# Patient Record
Sex: FEMALE | Race: WHITE
[De-identification: names, ages, dates, MRNs, and addresses within clinical notes are randomized per-mention and may not be internally consistent; named-entity substitution may affect disease eponyms.]

---

## 2018-04-24 ENCOUNTER — HOSPITAL ENCOUNTER (EMERGENCY)
Dept: HOSPITAL 25 - ED | Age: 53
Discharge: HOME | End: 2018-04-24
Payer: COMMERCIAL

## 2018-04-24 VITALS — SYSTOLIC BLOOD PRESSURE: 124 MMHG | DIASTOLIC BLOOD PRESSURE: 82 MMHG

## 2018-04-24 DIAGNOSIS — M54.5: Primary | ICD-10-CM

## 2018-04-24 DIAGNOSIS — S09.90XA: ICD-10-CM
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
## 2018-04-24 DIAGNOSIS — S30.0XXA: ICD-10-CM
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
## 2018-04-24 PROCEDURE — 72128 CT CHEST SPINE W/O DYE: CPT

## 2018-04-24 PROCEDURE — 72125 CT NECK SPINE W/O DYE: CPT

## 2018-04-24 PROCEDURE — 99282 EMERGENCY DEPT VISIT SF MDM: CPT

## 2018-04-24 PROCEDURE — 72131 CT LUMBAR SPINE W/O DYE: CPT

## 2018-04-24 PROCEDURE — 70450 CT HEAD/BRAIN W/O DYE: CPT

## 2018-04-24 NOTE — RAD
HISTORY: Fall, back pain



COMPARISONS: None



TECHNIQUE: Multiple contiguous axial CT scans were obtained of the thoracic spine without 

intravenous contrast, with coronal and sagittal multiplanar reformations.        



FINDINGS:



SPINAL CANAL: Evaluation of the central canal is limited on CT technique; however, there

is no obvious canalicular mass or epidural hemorrhage.

ALIGNMENT: There is mild extra scoliotic curvature of the spine.

VERTEBRAL BODIES: The vertebral bodies are preserved in height. The bones are normal in

attenuation. There is multilevel anterolateral marginal osteophyte formation.

JOINTS: There is mild osteoarthritis of the costovertebral articulations.

MUSCULATURE: Unremarkable

INTERVERTEBRAL DISCS: There is diffuse loss of intervertebral disc height throughout the

spine.



AXIAL IMAGES: There is no osseous central canal stenosis or neuroforaminal narrowing.



SOFT TISSUES: The visualized soft tissues of the chest and abdomen are unremarkable.



OTHER: None



IMPRESSION:

NO ACUTE OSSEOUS INJURY TO THE THORACIC SPINE

## 2018-04-24 NOTE — ED
Back Pain





- HPI Summary


HPI Summary: 





Patient is a 53-year-old female presenting to the ED with the chief complaint 

of low back pain and dizziness.  She states she sustained an injury 2 days ago 

after falling from a forklift and landing directly onto her lumbar spine and 

hitting her head.  She was seen at [facility] who stapled her head, but did not 

obtain a CT scan.  At the time, she denied any pain to her back.  She states on 

the way home from the hospital she began to feel pain in her back.  She arrives 

on this date to further evaluate the pain in her back and endorses a large 

amount of ecchymosis to the area.  She has not taken anything for relief.  She 

also endorses dizziness 2 days, only at bedtime when she is trying to sleep.  

This is new since the head injury.  She denies any confusion, memory loss, 

photophobia, nausea or other migrainous symptoms.  Symptoms are aggravated with 

lying flat and attempting to sleep, alleviated with nothing.





- History of Current Complaint


Chief Complaint: EDBackInjuryPain


Stated Complaint: FALL/BRUISING ON BACK


Time Seen by Provider: 04/24/18 13:02


Hx Obtained From: Patient


Onset/Duration: Sudden Onset


Onset/Duration: Started Days Ago


Timing: Constant


Back Pain Location: Is Discrete @ - Low back, right-sided parietal scalp


Pain Intensity: 2


Pain Scale Used: 0-10 Numeric


Character: Aching, Throbbing


Aggravating Symptom(s): Movement, Lifting, Bending


Alleviating Symptom(s): Rest, Position, Heat


Associated Signs And Symptoms: Positive: Swelling, Bruising.  Negative: Redness

, Fever, Weakness, Numbness, Tingling, Abdominal Pain, Flank Pain, Bladder 

Incontinence, Bowel Incontinence, Weight Loss, Pain with Weight Bearing





- Risk Factors


AAA Risk Factors: Negative


TAD Risk Factors: Negative


Cauda Equina Risk Factors: Negative


Epidural Abscess Risk Factors: Negative





- Allergies/Home Medications


Allergies/Adverse Reactions: 


 Allergies











Allergy/AdvReac Type Severity Reaction Status Date / Time


 


No Known Allergies Allergy   Verified 04/24/18 12:18











Home Medications: 


 Home Medications





Dextroamphetamine/Amphetamine [Amphetamine/Dextroampheta 30 mg-] 30 mg PO DAILY 

04/24/18 [History Confirmed 04/24/18]


Lisinopril [Lisinopril] 20 mg PO DAILY 04/24/18 [History Confirmed 04/24/18]











PMH/Surg Hx/FS Hx/Imm Hx


Previously Healthy: Yes





- Immunization History


Hx Pertussis Vaccination: No


Immunizations Up to Date: Unable to Obtain/Confirm


Infectious Disease History: No


Infectious Disease History: 


   Denies: Traveled Outside the [place] in Last 30 Days





- Social History


Occupation: Employed Full-time


Lives: With Family


Alcohol Use: Rare


Hx Substance Use: No


Substance Use Type: Reports: None


Hx Tobacco Use: No


Smoking Status (MU): Never Smoked Tobacco





Review of Systems


Constitutional: Negative


Negative: Fever, Chills, Fatigue, Skin Diaphoresis


Negative: Photophobia, Blurred Vision


Negative: Palpitations, Chest Pain


Negative: Shortness Of Breath, Cough


Negative: Abdominal Pain, Vomiting, Diarrhea, Nausea


Genitourinary: Negative


Positive: no symptoms reported, see HPI


Positive: Myalgia - bilateral lower back pain


Positive: Bruising - ecchymosis to the lower back


Neurological: Other - dizziness


All Other Systems Reviewed And Are Negative: Yes





Physical Exam


Triage Information Reviewed: Yes


Vital Signs On Initial Exam: 


 Initial Vitals











Temp Pulse Resp BP Pulse Ox


 


 98.2 F   61   16   119/88   98 


 


 04/24/18 12:14  04/24/18 12:14  04/24/18 12:14  04/24/18 12:14  04/24/18 12:14











Vital Signs Reviewed: Yes


Appearance: Positive: Well-Appearing, Well-Nourished


Skin: Positive: Warm, Skin Color Reflects Adequate Perfusion, Other - 

Ecchymosis to the bilateral lower back; Staples already placed to the right 

parietal area of the scalp


Head/Face: Positive: Normal Head/Face Inspection - Staples are placed to the 

right parietal area


Eyes: Positive: EOMI, LEATHA, Conjunctiva Clear


Neck: Positive: Supple, No Lymphadenopathy


Respiratory/Lung Sounds: Positive: Clear to Auscultation, Breath Sounds Present


Cardiovascular: Positive: RRR, Pulses are Symmetrical in both Upper and Lower 

Extremities


Musculoskeletal: Positive: Normal, Strength/ROM Intact, Other - Full range of 

motion, rotation at the hips without pain.  Negative: Limited @, Interruption @

, Erlin Sign Left, Erlin Sign Right, Edema Left, Edema Right


Neurological: Positive: Normal, Sensory/Motor Intact, Alert, Oriented to Person 

Place, Time, CN Intact II-III, Normal Gait, Facial Symmetry.  Negative: Facial 

Droop, Slurred Speech, Heel to Toe, Finger to Nose, Ataxic Gait


Psychiatric: Positive: Normal, Affect/Mood Appropriate


AVPU Assessment: Alert





Diagnostics





- Vital Signs


 Vital Signs











  Temp Pulse Resp BP Pulse Ox


 


 04/24/18 15:45  98 F  67  16  124/82  100


 


 04/24/18 12:14  98.2 F  61  16  119/88  98














- Laboratory


Lab Statement: Any lab studies that have been ordered have been reviewed, and 

results considered in the medical decision making process.





Back Pain Course/Dx





- Course


Course Of Treatment: During the course of treatment the patient is evaluated 

for multiple trauma.  She is evaluated for right-sided head trauma.  Staples 

are currently into the right parietal scalp.  The lower lumbar spine is with 

hematoma and ecchymosis diffusely throughout with inflammation surrounding.  

Tenderness to the bilateral lower back without tenderness to the spine.  Full 

range of motion and strength to the lower extremities.  Pulses +2 intact 

bilaterally.  No edema in the bilateral extremities.  She denies any confusion, 

memory last, visual disturbances or changes, however endorses some dizziness 

with lying flat.  CT brain, cervical, thoracic, and lumbar spine obtained.  

Spine CTs show no acute findings, however brain CT shows:  IMPRESSION:  1. NO 

EVIDENCE FOR ACUTE INTRACRANIAL ABNORMALITY.  2. 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  I discussed these findings with 

the patient and she agrees to follow up with her PCP regarding the CT brain 

findings.  Vital signs are stable upon discharge.  She is given meclizine and 

tramadol for pain relief and dizziness and will follow-up with her doctor this 

week.





- Diagnoses


Differential Diagnosis/HQI/PQRI: Positive: Fracture, Strain, Sprain, Other - 

Hematoma, contusion


Provider Diagnoses: 


 Hematoma, Head injury








Images





- Images


Full Body (No Head): 


  __________________________














  __________________________





 1 - Ecchymosis








Discharge





- Sign-Out/Discharge


Documenting (check all that apply): Discharge/Admit/Transfer





- Discharge Plan


Condition: Stable


Disposition: HOME


Prescriptions: 


Meclizine TAB* [Antivert 12.5 TAB*] 12.5 mg PO TID PRN #12 tab MDD 3


 PRN Reason: Dizziness


traMADol TAB* [Ultram*] 50 mg PO Q8H PRN #12 tab MDD 3


 PRN Reason: Pain


Patient Education Materials:  Hematoma (ED)


Forms:  *Work Release


Referrals: 


Edmar Dietrich MD [Primary Care Provider] - 


Additional Instructions: 


Please follow up with your PCP regarding your findings on CT exam


Ibuprofen 600mng three times daily for inflammation


Tramadol 50mg up to three times daily for pain - take on opposite schedule of 

ibuprofen


Moist heat to the area





- Billing Disposition and Condition


Condition: STABLE


Disposition: HOME

## 2018-04-24 NOTE — RAD
INDICATION:  Fall, head injury.



COMPARISON:  There are no prior studies available for comparison.



TECHNIQUE: Contiguous axial sections of the brain were obtained from the skull base to the

vertex without contrast.



FINDINGS: The ventricles, cisterns and sulci are within normal limits.  There is a small

area of decreased density in the right caudate nucleus head. No other focal lateral

mallear mass effect are seen. There is no evidence for hemorrhage.



No significant focal osseous abnormality is seen. The visualized portion of the paranasal

sinuses and mastoid air cells appear clear.



IMPRESSION:  

1. NO EVIDENCE FOR ACUTE INTRACRANIAL ABNORMALITY.

2. 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

## 2018-04-24 NOTE — RAD
HISTORY: Fall. No other history is provided



COMPARISONS: None



TECHNIQUE: Multiple contiguous axial CT scans were obtained of the cervical spine without

intravenous contrast, with coronal and sagittal multiplanar reformations.    



FINDINGS:



BRAIN: The visualized brain is unremarkable

CENTRAL CANAL: Evaluation of the central canal is limited on CT technique; however, there

is no obvious canalicular mass or epidural hemorrhage.



ALIGNMENT: There is straightening of the cervical lordosis. There is trace anterolisthesis

of C4 on C5.

VERTEBRAL BODIES: There is anterolateral marginal osteophyte formation most pronounced at

C5-C6 and C6-C7. There is no displaced fracture.

JOINTS: There is uncovertebral and facet osteoarthritis.

MUSCULATURE: Unremarkable

INTERVERTEBRAL DISCS: There is diffuse loss of intervertebral disc height.



AXIAL IMAGES:

C2-C3: There is no osseous neural foraminal narrowing or central canal stenosis.

C3-C4: There is no osseous neural foraminal narrowing or central canal stenosis.

C4-C5: There is moderate left neural foraminal narrowing secondary to uncovertebral and

facet atrophy. There is no osseous neural foraminal area.

C5-C6: There is moderate right and mild left neuroforaminal narrowing. There is no osseous

central canal stenosis.

C6-C7: There is mild bilateral neural foraminal narrowing. There is no osseous central

canal stenosis.

C7-T1: There is no osseous neural foraminal narrowing or central canal stenosis.



SOFT TISSUES: The visualized soft tissues of the neck are unremarkable. The prevertebral

fat stripe is preserved.



OTHER: None.



IMPRESSION: 

DEGENERATIVE DISC DISEASE AND OSTEOARTHRITIS

NO ACUTE OSSEOUS INJURY TO THE CERVICAL SPINE.

## 2018-04-24 NOTE — RAD
HISTORY: Fall, back pain



COMPARISONS: None



TECHNIQUE: Multiple contiguous axial CT scans were obtained of the lumbar spine without 

intravenous contrast, with coronal and sagittal multiplanar reformations.     



FINDINGS:



SPINAL CANAL: Evaluation of the central canal is limited on CT technique; however, there

is no obvious canalicular mass or epidural hemorrhage.

ALIGNMENT: The alignment is normal.

VERTEBRAL BODIES: There is anterolateral marginal osteophyte formation at L2-L3 and L5-S1.

There are mild reactive end plate changes.

JOINTS: There is mild facet osteoarthritis.

MUSCULATURE: Unremarkable

INTERVERTEBRAL DISCS: There is diffuse loss of intervertebral disc height throughout the

spine.



AXIAL IMAGES:

T12-L1: There is no osseous neural foraminal narrowing or central canal stenosis.

L1-L2: There is no osseous neural foraminal narrowing or central canal stenosis.

L2-L3: There is no osseous neural foraminal narrowing or central canal stenosis.

L3-L4: There is no osseous neural foraminal narrowing or central canal stenosis.

L4-L5: There is a broad-based disc bulge. There is no osseous neural foraminal or central

canal stenosis.

L5-S1: There is marginal osteophyte formation at the neural foramina bilaterally with a

mobile disc bulge and bilateral facet hypertrophy. There is moderate right and severe left

neural foraminal narrowing. There is no osseous central canal stenosis.



SOFT TISSUES: The visualized soft tissues of the abdomen are unremarkable.



OTHER: None



IMPRESSION:

1.  DEGENERATIVE DISC DISEASE AND OSTEOARTHRITIS MOST PRONOUNCED AT L5-S1.

2.  NO ACUTE OSSEOUS INJURY TO THE LUMBAR SPINE.

## 2019-10-13 ENCOUNTER — HOSPITAL ENCOUNTER (EMERGENCY)
Dept: HOSPITAL 25 - ED | Age: 54
Discharge: HOME | End: 2019-10-13
Payer: COMMERCIAL

## 2019-10-13 VITALS — DIASTOLIC BLOOD PRESSURE: 73 MMHG | SYSTOLIC BLOOD PRESSURE: 116 MMHG

## 2019-10-13 DIAGNOSIS — S46.011A: Primary | ICD-10-CM
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
## 2019-10-13 DIAGNOSIS — S40.021A: ICD-10-CM

## 2019-10-13 DIAGNOSIS — I10: ICD-10-CM

## 2019-10-13 PROCEDURE — 99282 EMERGENCY DEPT VISIT SF MDM: CPT
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
## 2019-11-27 ENCOUNTER — HOSPITAL ENCOUNTER (OUTPATIENT)
Dept: HOSPITAL 25 - OR | Age: 54
Discharge: HOME | End: 2019-11-27
Attending: ORTHOPAEDIC SURGERY
Payer: COMMERCIAL

## 2019-11-27 VITALS — DIASTOLIC BLOOD PRESSURE: 67 MMHG | SYSTOLIC BLOOD PRESSURE: 106 MMHG
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
## 2019-11-27 DIAGNOSIS — S46.011D: Primary | ICD-10-CM

## 2019-11-27 DIAGNOSIS — G89.18: ICD-10-CM
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
## 2019-11-27 DIAGNOSIS — M75.41: ICD-10-CM

## 2019-11-27 DIAGNOSIS — F17.210: ICD-10-CM

## 2019-11-27 PROCEDURE — C1713 ANCHOR/SCREW BN/BN,TIS/BN: HCPCS
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
## 2020-03-09 ENCOUNTER — HOSPITAL ENCOUNTER (EMERGENCY)
Dept: HOSPITAL 25 - ED | Age: 55
LOS: 1 days | Discharge: HOME | End: 2020-03-10
Payer: COMMERCIAL

## 2020-03-09 DIAGNOSIS — I10: ICD-10-CM

## 2020-03-09 DIAGNOSIS — R10.13: ICD-10-CM

## 2020-03-09 DIAGNOSIS — N39.0: Primary | ICD-10-CM

## 2020-03-09 DIAGNOSIS — R11.0: ICD-10-CM

## 2020-03-09 DIAGNOSIS — Z87.891: ICD-10-CM

## 2020-03-09 LAB
ALBUMIN SERPL BCG-MCNC: 4.4 G/DL (ref 3.2–5.2)
ALBUMIN/GLOB SERPL: 1.6 {RATIO} (ref 1–3)
ALP SERPL-CCNC: 68 U/L (ref 34–104)
ALT SERPL W P-5'-P-CCNC: 11 U/L (ref 7–52)
ANION GAP SERPL CALC-SCNC: 10 MMOL/L (ref 2–11)
AST SERPL-CCNC: 17 U/L (ref 13–39)
BASOPHILS # BLD AUTO: 0 10^3/UL (ref 0–0.2)
BUN SERPL-MCNC: 36 MG/DL (ref 6–24)
BUN/CREAT SERPL: 20.8 (ref 8–20)
CALCIUM SERPL-MCNC: 10.4 MG/DL (ref 8.6–10.3)
CHLORIDE SERPL-SCNC: 95 MMOL/L (ref 101–111)
EOSINOPHIL # BLD AUTO: 0.1 10^3/UL (ref 0–0.6)
GLOBULIN SER CALC-MCNC: 2.7 G/DL (ref 2–4)
GLUCOSE SERPL-MCNC: 117 MG/DL (ref 70–100)
HCO3 SERPL-SCNC: 29 MMOL/L (ref 22–32)
HCT VFR BLD AUTO: 45 % (ref 35–47)
HGB BLD-MCNC: 15.4 G/DL (ref 12–16)
LYMPHOCYTES # BLD AUTO: 0.9 10^3/UL (ref 1–4.8)
MCH RBC QN AUTO: 30 PG (ref 27–31)
MCHC RBC AUTO-ENTMCNC: 34 G/DL (ref 31–36)
MCV RBC AUTO: 88 FL (ref 80–97)
MONOCYTES # BLD AUTO: 0.8 10^3/UL (ref 0–0.8)
NEUTROPHILS # BLD AUTO: 3.5 10^3/UL (ref 1.5–7.7)
NRBC # BLD AUTO: 0 10^3/UL
NRBC BLD QL AUTO: 0.1
PLATELET # BLD AUTO: 262 10^3/UL (ref 150–450)
POTASSIUM SERPL-SCNC: 2.9 MMOL/L (ref 3.5–5)
PROT SERPL-MCNC: 7.1 G/DL (ref 6.4–8.9)
RBC # BLD AUTO: 5.08 10^6 /UL (ref 3.7–4.87)
SODIUM SERPL-SCNC: 134 MMOL/L (ref 135–145)
WBC # BLD AUTO: 5.3 10^3/UL (ref 3.5–10.8)

## 2020-03-09 PROCEDURE — 96375 TX/PRO/DX INJ NEW DRUG ADDON: CPT

## 2020-03-09 PROCEDURE — 87086 URINE CULTURE/COLONY COUNT: CPT

## 2020-03-09 PROCEDURE — 80053 COMPREHEN METABOLIC PANEL: CPT

## 2020-03-09 PROCEDURE — 85025 COMPLETE CBC W/AUTO DIFF WBC: CPT

## 2020-03-09 PROCEDURE — 96361 HYDRATE IV INFUSION ADD-ON: CPT

## 2020-03-09 PROCEDURE — 96374 THER/PROPH/DIAG INJ IV PUSH: CPT

## 2020-03-09 PROCEDURE — 86140 C-REACTIVE PROTEIN: CPT

## 2020-03-09 PROCEDURE — 81015 MICROSCOPIC EXAM OF URINE: CPT

## 2020-03-09 PROCEDURE — 81003 URINALYSIS AUTO W/O SCOPE: CPT

## 2020-03-09 PROCEDURE — 74177 CT ABD & PELVIS W/CONTRAST: CPT

## 2020-03-09 PROCEDURE — 83690 ASSAY OF LIPASE: CPT

## 2020-03-09 PROCEDURE — 99283 EMERGENCY DEPT VISIT LOW MDM: CPT

## 2020-03-09 PROCEDURE — 83605 ASSAY OF LACTIC ACID: CPT

## 2020-03-09 PROCEDURE — 36415 COLL VENOUS BLD VENIPUNCTURE: CPT
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
## 2020-03-09 NOTE — XMS REPORT
Continuity of Care Document (CCD)

 Created on:January 10, 2020



Patient:Veronica [place]

Sex:Female

:1965

External Reference #:[id number]





Demographics







 Address  [address] [place] [address]

 

 Mobile Phone  [phone]

 

 Email Address  [email or web address]

 

 Preferred Language  en

 

 Marital Status  Not  or 

 

 Alevism Affiliation  Unknown

 

 Race  White

 

 Ethnic Group  Not  or 









Author







 Name  Renea Napoles MD (transmitted by agent of provider Kenny Arita)

 

 Address  [address] [facility] [address]



   [place] [address]









Care Team Providers







 Name  Role  Phone

 

 Bill Carcamo PA-C - Physician  Care Team Information   [phone]
[phone]



 Assistant    









Problems







 Active Problems  Provider  Date

 

 Strain of rotator cuff capsule  Renea Napoles MD  Onset: 2019







Social History







 Type  Date  Description  Comments

 

 Birth Sex    Unknown  

 

 ETOH Use    Occasionally consumes alcohol  

 

 Tobacco Use  Start: Unknown  Patient is a current smoker,  



     smokes every day  

 

 Smoking Status  Reviewed: 01/10/20  Patient is a current smoker,  



     smokes every day  

 

 Exercise Type/Frequency    Exercises sporadically  







Allergies, Adverse Reactions, Alerts







 Description

 

 No Known Drug Allergies







Medications







 Active Medications  SIG  Qnty  Indications  Ordering Provider  Date

 

 Lisinopril-Hydrochlorot  1 by mouth every      Unknown  



 hiazide  day        



     20-12.5mg Tablets          



           

 

 Atorvastatin Calcium  Take One Tablet      Unknown  



                  20mg  By Mouth Every        



 Tablets  Day        



           

 

 Adderall XR  1 by mouth every      Unknown  



         30mg Caps ER  day        



 24HR          









 History Medications









 Oxycodone-Acetaminophen  1 tabs by  24tabs    Renea Napoles,  2019 -



             5-325mg Tablets  mouth every      MD  12/10/2019



   4-6 hours as        



   needed for        



   pain. MDD 6        

 

 Cephalexin  take 1 by  12caps    Renea Napoles,  2019 -



 500mg Capsules  mouth four      MD  12/10/2019



   times a day x        



   3 days post op        







Immunizations







 Description

 

 No Information Available







Vital Signs







 Date  Vital  Result  Comment

 

 01/10/2020  9:18am  Height  65.5 inches  5'5.50"









 Weight  177.00 lb  

 

 Heart Rate  72 /min  

 

 BP Systolic  130 mmHg  

 

 BP Diastolic  78 mmHg  

 

 Respiratory Rate  12 /min  

 

 Body Temperature  97.3 F  

 

 Pain Level  0  

 

 BMI (Body Mass Index)  29.0 kg/m2  









 12/10/2019  9:18am  Height  65 inches  5'5"









 Weight  160.00 lb  

 

 Heart Rate  68 /min  

 

 BP Systolic  138 mmHg  

 

 BP Diastolic  86 mmHg  

 

 Respiratory Rate  18 /min  

 

 Body Temperature  97.5 F  

 

 Pain Level  0  

 

 BMI (Body Mass Index)  26.6 kg/m2  







Results







 Description

 

 No Information Available







Procedures







 Date  Code  Description  Status

 

 2019  52986  Arthroscopy Shoulder,W/Rotator Cuff Repair  Completed

 

 2019  77431  Arthroscopy Shoulder,W/Rotator Cuff Repair  Completed

 

 2019  07267  Arthroscopy,Shoulder Decompression Of Subacromial Space  
Completed



     W/Acromio  

 

 2019  81881  Arthroscopy,Shoulder Decompression Of Subacromial Space  
Completed



     W/Acromio  







Medical Devices







 Description

 

 No Information Available







Encounters







 Type  Date  Location  Provider  Dx  Diagnosis

 

 Office Visit  2019  [facility]  Renea Napoles MD  S46.011D  
Strain of



   9:30a  at [place]      musc/tend the



           rotator cuff of



           right shoulder,



           subs

 

 Office Visit  10/15/2019  [facility] MD  M25.511  Pain 
in right



   11:00a  at [place]      shoulder









 S46.011A  Strain of musc/tend the rotator cuff of right shoulder, init







Assessments







 Date  Code  Description  Provider

 

 01/10/2020  S46.011D  Strain of muscle(s) and tendon(s) of the  Renea Napoles MD



     rotator cuff of right shoulder,  



     subsequent encounter  

 

 01/10/2020  M75.41  Impingement syndrome of right shoulder  Renea Napoles MD

 

 12/10/2019  S46.011D  Strain of muscle(s) and tendon(s) of the  Renea Napoles MD



     rotator cuff of right shoulder,  



     subsequent encounter  

 

 12/10/2019  M75.41  Impingement syndrome of right shoulder  Renea Napoles MD

 

 2019  S46.011A  Strain of muscle(s) and tendon(s) of the  Evelyn Durbin PA-C



     rotator cuff of right shoulder, initial  



     encounter  

 

 2019  M75.41  Impingement syndrome of right shoulder  Evelyn Durbin PA-C

 

 2019  S46.011A  Strain of muscle(s) and tendon(s) of the  Renea Napoles MD



     rotator cuff of right shoulder, initial  



     encounter  

 

 2019  M75.41  Impingement syndrome of right shoulder  Renea Napoles MD

 

 2019  S46.011D  Strain of muscle(s) and tendon(s) of the  Renea Napoles MD



     rotator cuff of right shoulder,  



     subsequent encounter  

 

 10/15/2019  M25.511  Pain in right shoulder  Renea Napoles MD

 

 10/15/2019  S46.011A  Strain of muscle(s) and tendon(s) of the  Renea Napoles MD



     rotator cuff of right shoulder, initial  



     encounter  







Plan of Treatment

01/10/2020 - AYAN Griffin46.011D Strain of muscle(s) and tendon(s) of the 
rotator cuff of right shoulder, subsequent encounterFollow up:Follow up:   As 
kbzdgwH04.41 Impingement syndrome of right shoulder



Functional Status







 Description

 

 No Information Available







Mental Status







 Description

 

 No Information Available







Referrals







 Description

 

 No Information Available
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
## 2020-03-10 VITALS — SYSTOLIC BLOOD PRESSURE: 113 MMHG | DIASTOLIC BLOOD PRESSURE: 71 MMHG

## 2020-03-10 LAB
RBC UR QL AUTO: (no result)
WBC UR QL AUTO: (no result)
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
## 2020-03-10 NOTE — ED
Progress





- Progress Note


Progress Note: 





Patient is received as a sign-out from JONNATHAN Sheffield at 0230 3/10/20 PA 

shift end pending CT Abd/Pel. 





CT abd/pel IMPRESSION: 


No acute findings. 


This report was reviewed by ED physician





Patient was discharged to home with Dx of UTI and augmentin and reglan 

prescription. 





 Home Medications











 Medication  Instructions  Recorded  Confirmed  Type


 


Dextroamphetamine/Amphetamine 30 mg PO QAM 04/24/18 11/27/19 History





[Amphetamine/Dextroampheta 30 mg-]    


 


Atorvastatin* [Lipitor*] 20 mg PO QAM 11/20/19 11/27/19 History


 


Lisinopril/Hydrochlorothiazide 1 each PO QAM 11/20/19 11/27/19 History





[Lisinopril-Hctz 20-12.5 mg Tab]    


 


Escitalopram Oxalate [Lexapro 10 10 mg PO QAM 11/26/19 11/27/19 History





mg]    


 


Amoxicillin/Clavulanate TAB* 500 mg PO BID #10 tab 03/10/20  Rx





[Augmentin  mg*]    


 


Metoclopramide TAB* [Reglan TAB*] 10 mg PO Q6H #20 tab 03/10/20  Rx














Re-Evaluation





- Re-Evaluation


  ** First Eval


Re-Evaluation Time: 02:01


Change: Improved


Comment: nausea improved with reglan





Course/Dx





- Diagnoses


Provider Diagnoses: 


 Epigastric pain, Nausea, UTI (urinary tract infection)








Discharge ED





- Sign-Out/Discharge


Documenting (check all that apply): Patient Departure - discharge 





- Discharge Plan


Condition: Stable


Disposition: HOME


Prescriptions: 


Amoxicillin/Clavulanate TAB* [Augmentin  mg*] 500 mg PO BID #10 tab


Metoclopramide TAB* [Reglan TAB*] 10 mg PO Q6H #20 tab


Patient Education Materials:  Urinary Tract Infection in Women (ED)


Referrals: 


Bill Carcamo PA [Primary Care Provider] - 3 Days


Additional Instructions: 


PLEASE RETURN TO ED FOR ANY NEW OR WORSENING SYMPTOMS. PLEASE FOLLOW UP WITH 

YOUR PRIMARY CARE PHYSICIAN WITHIN THREE DAYS. 





- Billing Disposition and Condition


Condition: STABLE


Disposition: Home





- Attestation Statements


Document Initiated by Scribe: Yes


Documenting Scribe: JESSE PERKINS


Provider For Whom Scribe is Documenting (Include Credential): LEO ACE MD


Scribe Attestation: 


IJESSE, scribed for LEO ACE MD on 03/10/20 at 0459. 


Scribe Documentation Reviewed: Yes


Provider Attestation: 


The documentation as recorded by the scribe, JESSE PERKINS accurately reflects 

the service I personally performed and the decisions made by me, LEO ACE MD


Status of Scribe Document: Viewed